# Patient Record
Sex: FEMALE | Race: WHITE
[De-identification: names, ages, dates, MRNs, and addresses within clinical notes are randomized per-mention and may not be internally consistent; named-entity substitution may affect disease eponyms.]

---

## 2017-08-24 ENCOUNTER — HOSPITAL ENCOUNTER (OUTPATIENT)
Dept: HOSPITAL 62 - RAD | Age: 27
End: 2017-08-24
Attending: INTERNAL MEDICINE
Payer: OTHER GOVERNMENT

## 2017-08-24 DIAGNOSIS — M25.562: Primary | ICD-10-CM

## 2017-08-24 NOTE — RADIOLOGY REPORT (SQ)
EXAM DESCRIPTION:  MRI LT LOWER JOINT WITHOUT



COMPLETED DATE/TIME:  8/24/2017 11:06 am



REASON FOR STUDY:  SEVERE LEFT KNEE PAIN M25.562  PAIN IN LEFT KNEE



COMPARISON:  None.



TECHNIQUE:  Leftknee images acquired and stored on PACS.  Multiplanar images include fat sensitive se
quences as T1, water sensitive sequences as FST2 or STIR, cartilage sensitive sequences as FSPD, and 
gradient echo sequences.



LIMITATIONS:  None.



FINDINGS:  JOINT AND BURSAE: 0.8 x 1.6 by 2 cm well-circumscribed fluid signal lesion just lateral to
 the insertion of the semimembranosus tendon and anterior to the medial head of the gastrocnemius.  T
his is probably a variant popliteal fossa cyst or semimembranosus tibial collateral ligament bursa va
riant, however the lesion is more lateral location than expected for the latter.

BONE CORTEX AND MARROW: No alteration of signal to suggest marrow replacement. No worrisome bone lesi
ons. No occult fracture.

ACL: Intact. No degeneration or ganglion cyst.

PCL: Intact.

MCL: Intact. No periligamentous edema or fluid.

LCL: Intact. No periligamentous edema or fluid.

MEDIAL MENISCUS: Intact.

LATERAL MENISCUS: Intact.

MEDIAL COMPARTMENT: Cartilage preserved. No bone bruises or reactive marrow edema. No osteophytes.

LATERAL COMPARTMENT: Cartilage preserved. No bone bruises or reactive marrow edema. No osteophytes.

PATELLA: No chondromalacia. No subchondral cysts. Medial and lateral retinacula intact.

EXTENSOR MECHANISM: Intact. Quadriceps and patella tendons normal.

SOFT TISSUES: See above.

OTHER: No other significant finding.



IMPRESSION:  Popliteal fossa cyst of unlikely clinical significance.  No meniscal or ligament tear id
entified.



TECHNICAL DOCUMENTATION:  JOB ID:  [id number]

 [employer]- All Rights Reserved

## 2017-10-06 ENCOUNTER — HOSPITAL ENCOUNTER (EMERGENCY)
Dept: HOSPITAL 62 - ER | Age: 27
Discharge: HOME | End: 2017-10-06
Payer: OTHER GOVERNMENT

## 2017-10-06 VITALS — DIASTOLIC BLOOD PRESSURE: 76 MMHG | SYSTOLIC BLOOD PRESSURE: 127 MMHG

## 2017-10-06 DIAGNOSIS — M79.672: Primary | ICD-10-CM
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
## 2017-10-06 PROCEDURE — 99283 EMERGENCY DEPT VISIT LOW MDM: CPT

## 2017-10-06 NOTE — ER DOCUMENT REPORT
HPI





- HPI


Patient complains to provider of: Left foot pain


Onset: This afternoon


Onset/Duration: Gradual


Quality of pain: Achy


Pain Level: 4


Context: 





Patient states that she was in the garden weeding today around 1030 this 

morning.  Patient does not recall any insect bites to her feet.  Patient was 

wearing flip-flops at the time.  Patient reports that later in the afternoon 

around 2 PM she went to put her foot in the shoe and noticed that she had left 

foot swelling, tenderness and mild erythema to dorsal aspect of left foot.  

Patient denies any respiratory complaints or difficulty breathing.  


Associated Symptoms: Other - Left foot pain.  denies: Fever


Exacerbated by: Movement


Relieved by: Denies


Similar symptoms previously: No


Recently seen / treated by doctor: No





- ROS


ROS below otherwise negative: Yes


Systems Reviewed and Negative: Yes All other systems reviewed and negative





- CONSTITUTIONAL


Constitutional: DENIES: Fever





- EENT


EENT: DENIES: Sore Throat





- RESPIRATORY


Respiratory: DENIES: Trouble Breathing, Coughing





- GASTROINTESTINAL


Gastrointestinal: DENIES: Nausea, Patient vomiting





- REPRODUCTIVE


LMP: 9/25/2017





- MUSCULOSKELETAL


Musculoskeletal: REPORTS: Extremity pain, Swelling





- DERM


Skin Color: Erythema


Skin Problems: None





Past Medical History





- General


Information source: Patient





- Social History


Smoking Status: Never Smoker


Chew tobacco use (# tins/day): No


Frequency of alcohol use: Occasional


Drug Abuse: None


Occupation: none


Family History: Reviewed & Not Pertinent


Patient has suicidal ideation: No


Patient has homicidal ideation: No





- Medical History


Medical History: Negative


Renal/ Medical History: Denies: Hx Peritoneal Dialysis


Surgical Hx: Negative





Vertical Provider Document





- CONSTITUTIONAL


Agree With Documented VS: Yes


Exam Limitations: No Limitations


General Appearance: WD/WN, No Apparent Distress





- INFECTION CONTROL


TRAVEL OUTSIDE OF THE [place] IN LAST 30 DAYS: No





- HEENT


HEENT: Atraumatic, Normal ENT Exam, Normocephalic





- NECK


Neck: Normal Inspection





- RESPIRATORY


Respiratory: Breath Sounds Normal, No Respiratory Distress, Chest Non-Tender


O2 Sat by Pulse Oximetry: 99





- CARDIOVASCULAR


Cardiovascular: Regular Rate, Regular Rhythm, No Murmur


Pulses: Normal: Dorsalis pedis





- MUSCULOSKELETAL/EXTREMETIES


Musculoskeletal/Extremeties: MAEW, FROM, Tender - Patient with left foot 

tenderness to dorsal aspect of distal first and second metatarsal with 

overlying erythema., Edema - 1+





- NEURO


Level of Consciousness: Awake, Alert, Appropriate


Motor/Sensory: No Motor Deficit





- DERM


Integumentary: Warm, Dry.  negative: Abscess


Notes: 





Erythema with 3 centralized papular lesions clustered over left first MT 





Course





- Re-evaluation


Re-evalutation: 





10/06/17 17:49


Patient with what appears to be swollen, inflamed insect bite.  No concern for 

snakebite at this time.  No concern for cellulitis.  No concern for foreign body





- Vital Signs


Vital signs: 


 











Temp Pulse Resp BP Pulse Ox


 


 98.3 F   106 H  18   127/76 H  99 


 


 10/06/17 17:11  10/06/17 17:11  10/06/17 17:11  10/06/17 17:11  10/06/17 17:11














Discharge





- Discharge


Clinical Impression: 


Insect bite


Qualifiers:


 Encounter type: initial encounter Qualified Code(s): W57.XXXA - Bitten or 

stung by nonvenomous insect and other nonvenomous arthropods, initial encounter





Condition: Stable


Disposition: HOME, SELF-CARE


Instructions:  Use of Diphenhydramine, Topical Steroid Cream or Ointment ([facility]), 

Swollen Insect Bite or Sting ([facility])


Additional Instructions: 


Return immediately for any new or worsening symptoms





Followup with your primary care provider, call tomorrow to make a followup 

appointment





Take Benadryl over-the-counter as directed to help with your symptoms








Prescriptions: 


Famotidine [Pepcid 20 mg Tablet] 20 mg PO BID #12 tablet


Naproxen [Naprosyn 250 Nmg Tablet] 1 tab PO BID #14 tablet


Triamcinolone Acetonide [Aristocort 0.1% Cream] 1 applic TP TID #60 gm


Referrals: 


[facility] [Provider Group] - Follow up as needed

## 2018-12-05 ENCOUNTER — HOSPITAL ENCOUNTER (OUTPATIENT)
Dept: HOSPITAL 62 - RAD | Age: 28
End: 2018-12-05
Attending: NURSE PRACTITIONER
Payer: OTHER GOVERNMENT

## 2018-12-05 DIAGNOSIS — R10.10: Primary | ICD-10-CM

## 2018-12-05 PROCEDURE — A9537 TC99M MEBROFENIN: HCPCS

## 2018-12-05 PROCEDURE — 78227 HEPATOBIL SYST IMAGE W/DRUG: CPT

## 2018-12-05 NOTE — RADIOLOGY REPORT (SQ)
EXAM DESCRIPTION:  NM HIDA SCAN WITH CCK



COMPLETED DATE/TIME:  12/5/2018 10:41 am



REASON FOR STUDY:  UPPER ABDOMINAL PAIN R10.10  UPPER ABDOMINAL PAIN, UNSPECIFIED



COMPARISON:  None.



RADIONUCLIDE AND DOSE:  DOSAGE RADIONUCLIDE: 4.9 millicuries Tc99m Mebrofenin.

DOSAGE CCK: 1.3 micrograms.

DOSAGE MORPHINE: Not required.

The route of agent administration: Intravenous



TECHNIQUE:  Serial imaging right upper quadrant up to 60 minutes following injection of radionuclide.
  CCK injected after gallbladder visualized.



LIMITATIONS:  None.



FINDINGS:  LIVER: Normal uptake of hepatobiliary agent by the liver which clears by 60 minutes.

INTRAHEPATIC BILE DUCTS: Normal visualization.

COMMON BILE DUCT: Normal visualization.

GALLBLADDER:   Normal visualization.  Calculated ejection fraction of 70%. Normal range is greater th
an 35%.

PHYSICAL RESPONSE:  Patients presenting complaint was reproduced.

OTHER: No other significant finding.



IMPRESSION:  No scintigraphic evidence of cystic duct or common duct obstruction.

Although the patient has a normal gallbladder ejection fraction, IV CCK reproduced the patient's symp
toms.



TECHNICAL DOCUMENTATION:  JOB ID:  [id number]

 2011 [employer]- All Rights Reserved



Reading location - IP/workstation name: [email or web address]